# Patient Record
(demographics unavailable — no encounter records)

---

## 2025-01-28 NOTE — REVIEW OF SYSTEMS
[Dysuria] : no dysuria [Joint Stiffness] : no joint stiffness [Muscle Pain] : no muscle pain [Muscle Weakness] : no muscle weakness [Back Pain] : back pain [Joint Swelling] : no joint swelling [Negative] : Neurological [FreeTextEntry8] : nocturia x 1 [FreeTextEntry9] : see HPI

## 2025-01-28 NOTE — PHYSICAL EXAM
[No Acute Distress] : no acute distress [Well Nourished] : well nourished [PERRL] : pupils equal round and reactive to light [Normal Oropharynx] : the oropharynx was normal [Normal TMs] : both tympanic membranes were normal [Normal Nasal Mucosa] : the nasal mucosa was normal [No Lymphadenopathy] : no lymphadenopathy [Supple] : supple [Thyroid Normal, No Nodules] : the thyroid was normal and there were no nodules present [No Accessory Muscle Use] : no accessory muscle use [Clear to Auscultation] : lungs were clear to auscultation bilaterally [Regular Rhythm] : with a regular rhythm [Normal S1, S2] : normal S1 and S2 [No Murmur] : no murmur heard [No Edema] : there was no peripheral edema [Soft] : abdomen soft [Non Tender] : non-tender [Non-distended] : non-distended [No Masses] : no abdominal mass palpated [No HSM] : no HSM [Normal Bowel Sounds] : normal bowel sounds [Penis Abnormality] : normal circumcised penis [Testes Tenderness] : no tenderness of the testes [Testes Mass (___cm)] : there were no testicular masses [Prostate Size ___ gm] : prostate size [unfilled] gm [No Spinal Tenderness] : no spinal tenderness [Normal] : the cranial nerves were intact [Sensation Tactile Decrease] : light touch was intact [2+] : left 2+ [Normal Affect] : the affect was normal [Normal Insight/Judgement] : insight and judgment were intact [de-identified] : +R. sacroiliac point tenderness

## 2025-01-28 NOTE — ASSESSMENT
[FreeTextEntry1] : 78M c CAD s/p PCI (2018, 2011), s/p CABG (1995), HTN, HLD, history of depression, prediabetes, BPH with LUTS, dry eyes, history of elevated PSA resolved (had seen urology), mild thrombocytopenia, ocular rosacea here for cpe    GHM -check fasting BW  physical ecg performed - ecg sinus bradycardia  utd with cardioogy visit pt states utd with flu shot -declined covid shot - recommend novavax vacicne  advised shingrix vaccine (d/w pt risks and benefits)  advised prevnar 20   utd with screening colonscopy 1/2017 - pt declines further colonoscopies  to f/u with optho re: dry eyes  saw ent - I recommend that he get hearing aids as per ent   utd with cardiology visit  utd with dental - has bridge  utd with fbse with delfina   rtc in 1year for cpe or prn

## 2025-01-28 NOTE — HISTORY OF PRESENT ILLNESS
[FreeTextEntry1] :  cpe  [de-identified] : diet: good exercise: walking  in interval - diagnosed w/ocular rosacea of L. eye by dermatologist (son is dermatologist) - is on doxycycline intermittently  saw cardiology in spring 2024  saw ent who recommended use of hearing aids (pt did not get fitted) states apple smart watch noted possible apnea, pt declined eval for sleep study at ths time  MSK - 3 months of intermittent R. sacroiliac pain with laying down on side or change of position - pain can be sharp, non radiatig - 5-6/10 - episodes last minutes then resolves - no back pain with walking